# Patient Record
Sex: FEMALE
[De-identification: names, ages, dates, MRNs, and addresses within clinical notes are randomized per-mention and may not be internally consistent; named-entity substitution may affect disease eponyms.]

---

## 2021-09-12 ENCOUNTER — APPOINTMENT (OUTPATIENT)
Dept: INTERNAL MEDICINE | Facility: CLINIC | Age: 86
End: 2021-09-12
Payer: MEDICARE

## 2021-09-12 ENCOUNTER — LABORATORY RESULT (OUTPATIENT)
Age: 86
End: 2021-09-12

## 2021-09-12 VITALS
HEIGHT: 59 IN | DIASTOLIC BLOOD PRESSURE: 76 MMHG | OXYGEN SATURATION: 98 % | TEMPERATURE: 97.9 F | WEIGHT: 163 LBS | SYSTOLIC BLOOD PRESSURE: 126 MMHG | BODY MASS INDEX: 32.86 KG/M2 | RESPIRATION RATE: 18 BRPM | HEART RATE: 85 BPM

## 2021-09-12 DIAGNOSIS — E66.9 OBESITY, UNSPECIFIED: ICD-10-CM

## 2021-09-12 DIAGNOSIS — R35.0 FREQUENCY OF MICTURITION: ICD-10-CM

## 2021-09-12 DIAGNOSIS — Z13.39 ENCOUNTER FOR SCREENING EXAM FOR OTHER MENTAL HEALTH AND BEHAVIORAL DISORDERS: ICD-10-CM

## 2021-09-12 DIAGNOSIS — Z00.00 ENCOUNTER FOR GENERAL ADULT MEDICAL EXAMINATION W/OUT ABNORMAL FINDINGS: ICD-10-CM

## 2021-09-12 DIAGNOSIS — Z13.31 ENCOUNTER FOR SCREENING FOR DEPRESSION: ICD-10-CM

## 2021-09-12 DIAGNOSIS — M51.27 OTHER INTERVERTEBRAL DISC DISPLACEMENT, LUMBOSACRAL REGION: ICD-10-CM

## 2021-09-12 PROCEDURE — G0444 DEPRESSION SCREEN ANNUAL: CPT | Mod: 59

## 2021-09-12 PROCEDURE — 99203 OFFICE O/P NEW LOW 30 MIN: CPT | Mod: 25

## 2021-09-12 PROCEDURE — G0442 ANNUAL ALCOHOL SCREEN 15 MIN: CPT

## 2021-09-12 PROCEDURE — G0439: CPT

## 2021-09-12 PROCEDURE — 36415 COLL VENOUS BLD VENIPUNCTURE: CPT

## 2021-09-12 RX ORDER — ACETAMINOPHEN 650 MG/1
650 TABLET, FILM COATED, EXTENDED RELEASE ORAL 4 TIMES DAILY
Qty: 360 | Refills: 3 | Status: ACTIVE | COMMUNITY
Start: 2021-09-12 | End: 1900-01-01

## 2021-09-12 NOTE — HISTORY OF PRESENT ILLNESS
[PMH Reviewed and Updated] : past medical history reviewed and updated [PSH Reviewed and Updated] : past surgical history reviewed and updated [Family History Reviewed and Updated] : family history reviewed and updated [Medication and Allergies Reconciled] : medication and allergies reconciled [3] : 3 [Retired] : retired from work [Smoking Status Reviewed and Updated] : Smoking status was reviewed and updated [None] : The patient has no concerns about alcohol abuse [Compliant with medications] : compliant with medications [Needs some help with ADLs] : need some help with ADLs [Does not drive] : does not drive [History of falls] : history of falls [FreeTextEntry1] : Right hip pain due to bad disc\par \par urinary frequency at night [Over the Past 2 Weeks, Have You Felt Down, Depressed, or Hopeless?] : 1.) Over the past 2 weeks, have you felt down, depressed, or hopeless? No [Over the Past 2 Weeks, Have You Felt Little Interest or Pleasure Doing Things?] : 2.) Over the past 2 weeks, have you felt little interest or pleasure doing things? No [FreeTextEntry6] : Fell at home with in [place].  As result is now living with daughter in [place] [Full assistance needed] : Assistance needed managing medications [] : Assistance needed managing finances. [Walker] : walker [Wheelchair] : wheelchair

## 2021-09-12 NOTE — PHYSICAL EXAM
[No Acute Distress] : no acute distress [Well Nourished] : well nourished [Well Developed] : well developed [Well-Appearing] : well-appearing [Normal Sclera/Conjunctiva] : normal sclera/conjunctiva [EOMI] : extraocular movements intact [PERRL] : pupils equal round and reactive to light [Normal Outer Ear/Nose] : the outer ears and nose were normal in appearance [Normal Oropharynx] : the oropharynx was normal [No Lymphadenopathy] : no lymphadenopathy [Supple] : supple [Thyroid Normal, No Nodules] : the thyroid was normal and there were no nodules present [No Respiratory Distress] : no respiratory distress  [Clear to Auscultation] : lungs were clear to auscultation bilaterally [No Accessory Muscle Use] : no accessory muscle use [Normal Rate] : normal rate  [Regular Rhythm] : with a regular rhythm [Normal S1, S2] : normal S1 and S2 [No Murmur] : no murmur heard [Soft] : abdomen soft [Non Tender] : non-tender [Non-distended] : non-distended [No Masses] : no abdominal mass palpated [No HSM] : no HSM [Normal Bowel Sounds] : normal bowel sounds [Normal Posterior Cervical Nodes] : no posterior cervical lymphadenopathy [Normal Anterior Cervical Nodes] : no anterior cervical lymphadenopathy [No CVA Tenderness] : no CVA  tenderness [No Spinal Tenderness] : no spinal tenderness [No Joint Swelling] : no joint swelling [Grossly Normal Strength/Tone] : grossly normal strength/tone [Coordination Grossly Intact] : coordination grossly intact [No Rash] : no rash [No Focal Deficits] : no focal deficits [Normal Gait] : normal gait [Deep Tendon Reflexes (DTR)] : deep tendon reflexes were 2+ and symmetric [Normal Affect] : the affect was normal [Normal Insight/Judgement] : insight and judgment were intact [de-identified] : right foot edema

## 2021-09-12 NOTE — HEALTH RISK ASSESSMENT
[Good] : ~his/her~  mood as  good [No] : No [Never (0 pts)] : Never (0 points) [Any fall with injury in past year] : Patient reported fall with injury in the past year [0] : 2) Feeling down, depressed, or hopeless: Not at all (0) [PHQ-2 Negative - No further assessment needed] : PHQ-2 Negative - No further assessment needed [With Family] : lives with family [Retired] : retired [] :  [] : No [[id number]] : 0 [Change in mental status noted] : No change in mental status noted [Reports changes in hearing] : Reports no changes in hearing [Reports changes in vision] : Reports no changes in vision

## 2021-09-15 ENCOUNTER — RX CHANGE (OUTPATIENT)
Age: 86
End: 2021-09-15

## 2021-09-15 LAB
25(OH)D3 SERPL-MCNC: 33.2 NG/ML
ALBUMIN SERPL ELPH-MCNC: 4.1 G/DL
ALP BLD-CCNC: 73 U/L
ALT SERPL-CCNC: 16 U/L
ANION GAP SERPL CALC-SCNC: 9 MMOL/L
AST SERPL-CCNC: 32 U/L
BASOPHILS # BLD AUTO: 0.04 K/UL
BASOPHILS NFR BLD AUTO: 0.6 %
BILIRUB SERPL-MCNC: 0.5 MG/DL
BUN SERPL-MCNC: 16 MG/DL
CALCIUM SERPL-MCNC: 9.7 MG/DL
CHLORIDE SERPL-SCNC: 103 MMOL/L
CHOLEST SERPL-MCNC: 238 MG/DL
CO2 SERPL-SCNC: 28 MMOL/L
CREAT SERPL-MCNC: 0.64 MG/DL
EOSINOPHIL # BLD AUTO: 0.16 K/UL
EOSINOPHIL NFR BLD AUTO: 2.3 %
ESTIMATED AVERAGE GLUCOSE: 134 MG/DL
FOLATE SERPL-MCNC: >20 NG/ML
GLUCOSE SERPL-MCNC: 131 MG/DL
HBA1C MFR BLD HPLC: 6.3 %
HCT VFR BLD CALC: 33.3 %
HDLC SERPL-MCNC: 53 MG/DL
HGB BLD-MCNC: 10.4 G/DL
IMM GRANULOCYTES NFR BLD AUTO: 0.3 %
LDLC SERPL CALC-MCNC: 154 MG/DL
LYMPHOCYTES # BLD AUTO: 2.8 K/UL
LYMPHOCYTES NFR BLD AUTO: 39.7 %
MAN DIFF?: NORMAL
MCHC RBC-ENTMCNC: 26.3 PG
MCHC RBC-ENTMCNC: 31.2 GM/DL
MCV RBC AUTO: 84.3 FL
MONOCYTES # BLD AUTO: 0.54 K/UL
MONOCYTES NFR BLD AUTO: 7.7 %
NEUTROPHILS # BLD AUTO: 3.49 K/UL
NEUTROPHILS NFR BLD AUTO: 49.4 %
NONHDLC SERPL-MCNC: 185 MG/DL
PLATELET # BLD AUTO: 214 K/UL
POTASSIUM SERPL-SCNC: 5.9 MMOL/L
PROT SERPL-MCNC: 7 G/DL
RBC # BLD: 3.95 M/UL
RBC # FLD: 16.9 %
SODIUM SERPL-SCNC: 140 MMOL/L
TRIGL SERPL-MCNC: 153 MG/DL
TSH SERPL-ACNC: 23.5 UIU/ML
VIT B12 SERPL-MCNC: 1280 PG/ML
WBC # FLD AUTO: 7.05 K/UL

## 2021-09-21 ENCOUNTER — APPOINTMENT (OUTPATIENT)
Dept: PHYSICAL MEDICINE AND REHAB | Facility: CLINIC | Age: 86
End: 2021-09-21

## 2021-10-07 ENCOUNTER — RX CHANGE (OUTPATIENT)
Age: 86
End: 2021-10-07

## 2021-10-19 ENCOUNTER — NON-APPOINTMENT (OUTPATIENT)
Age: 86
End: 2021-10-19

## 2021-12-19 DIAGNOSIS — U07.1 COVID-19: ICD-10-CM

## 2021-12-20 ENCOUNTER — APPOINTMENT (OUTPATIENT)
Dept: INTERNAL MEDICINE | Facility: CLINIC | Age: 86
End: 2021-12-20
Payer: MEDICARE

## 2021-12-20 VITALS
HEIGHT: 59 IN | OXYGEN SATURATION: 97 % | DIASTOLIC BLOOD PRESSURE: 86 MMHG | BODY MASS INDEX: 28.43 KG/M2 | SYSTOLIC BLOOD PRESSURE: 136 MMHG | RESPIRATION RATE: 17 BRPM | TEMPERATURE: 98.1 F | HEART RATE: 88 BPM | WEIGHT: 141 LBS

## 2021-12-20 DIAGNOSIS — K56.7 ILEUS, UNSPECIFIED: ICD-10-CM

## 2021-12-20 PROCEDURE — 99215 OFFICE O/P EST HI 40 MIN: CPT

## 2021-12-23 PROBLEM — K56.7 ILEUS: Status: ACTIVE | Noted: 2021-12-21

## 2021-12-23 NOTE — COUNSELING
[Fall prevention counseling provided] : Fall prevention counseling provided [Adequate lighting] : Adequate lighting [No throw rugs] : No throw rugs [Use proper foot wear] : Use proper foot wear [Use recommended devices] : Use recommended devices [Behavioral health counseling provided] : Behavioral health counseling provided [Sleep ___ hours/day] : Sleep [unfilled] hours/day [Engage in a relaxing activity] : Engage in a relaxing activity [Plan in advance] : Plan in advance [Potential consequences of obesity discussed] : Potential consequences of obesity discussed [Benefits of weight loss discussed] : Benefits of weight loss discussed [Structured Weight Management Program suggested:] : Structured weight management program suggested [Encouraged to maintain food diary] : Encouraged to maintain food diary [Encouraged to increase physical activity] : Encouraged to increase physical activity [Encouraged to use exercise tracking device] : Encouraged to use exercise tracking device [Weigh Self Weekly] : weigh self weekly [Decrease Portions] : decrease portions [None] : None [de-identified] : Bp stable, continue with medications, dash diet, exercise, and dietary management.Continue to check home Bp’s.\par \par Symptomatic patients : Test for influenza, if positive, treat for influenza and do not continue below. \par 1. Fever plus cough or shortness of breath : Test for RVP and COVID-19.\par 2.Indirect, circumstantial or unclear exposure to COVID-19, or other concerning cases not meeting above criteria: Please call [facility] to discuss testing. \par +++ All above cases must be reported to the [facility] registry. +++\par \par Asymptomatic patients: \par 1. Known first-degree direct-contact exposure to positive COVID-19 patient but asymptomatic: No testing PLUS 14 day self-quarantine. Pt to call if symptoms develop. Report to [facility] Registry.\par 2. No known exposure and asymptomatic, referred from outside healthcare organization: Please call [facility] to discuss testing. \par 3.All other asymptomatic patients with no known exposures: no testing, no exceptions.\par \par 1. Make an appointment with your doctor\par Begin your fall-prevention plan by making an appointment with your doctor. Be prepared to answer questions such as:\par \par What medications are you taking? Make a list of your prescription and over-the-counter medications and supplements, or bring them with you to the appointment. Your doctor can review your medications for side effects and interactions that may increase your risk of falling. To help with fall prevention, your doctor may consider weaning you off medications that make you tired or affect your thinking, such as sedatives and some types of antidepressants.\par Have you fallen before? Write down the details, including when, where and how you fell. Be prepared to discuss instances when you almost fell but were caught by someone or managed to grab hold of something just in time. Details such as these may help your doctor identify specific fall-prevention strategies.\par Could your health conditions cause a fall? Certain eye and ear disorders may increase your risk of falls. Be prepared to discuss your health conditions and how comfortable you are when you walk - for example, do you feel any dizziness, joint pain, shortness of breath, or numbness in your feet and legs when you walk? Your doctor may evaluate your muscle strength, balance and walking style (gait) as well.\par 2. Keep moving\par Physical activity can go a long way toward fall prevention. With your doctor's OK, consider activities such as walking, water workouts or shayan chi - a gentle exercise that involves slow and graceful dance-like movements. Such activities reduce the risk of falls by improving strength, balance, coordination and flexibility.\par \par If you avoid physical activity because you're afraid it will make a fall more likely, tell your doctor. He or she may recommend carefully monitored exercise programs or refer you to a physical therapist. The physical therapist can create a custom exercise program aimed at improving your balance, flexibility, muscle strength and gait.\par \par 3. Wear sensible shoes\par Consider changing your footwear as part of your fall-prevention plan. High heels, floppy slippers and shoes with slick soles can make you slip, stumble and fall. So can walking in your stocking feet. Instead, wear properly fitting, sturdy shoes with nonskid soles. Sensible shoes may also reduce joint pain.\par \par 4. Remove home hazards\par Take a look around your home. Your living room, kitchen, bedroom, bathroom, hallways and stairways may be filled with hazards. To make your home safer:\par \par Remove boxes, newspapers, electrical cords and phone cords from walkways.\par Move coffee tables, magazine racks and plant stands from high-traffic areas.\par Secure loose rugs with double-faced tape, tacks or a slip-resistant backing - or remove loose rugs from your home.\par Repair loose, wooden floorboards and carpeting right away.\par Store clothing, dishes, food and other necessities within easy reach.\par Immediately clean spilled liquids, grease or food.\par Use nonslip mats in your bathtub or shower. Use a bath seat, which allows you to sit while showering.\par 5. Light up your living space\par Keep your home brightly lit to avoid tripping on objects that are hard to see. Also:\par \par Place night lights in your bedroom, bathroom and hallways.\par Place a lamp within reach of your bed for middle-of-the-night needs.\par Make clear paths to light switches that aren't near room entrances. Consider trading traditional switches for glow-in-the-dark or illuminated switches.\par Turn on the lights before going up or down stairs.\par Store flashlights in easy-to-find places in case of power outages.\par 6. Use assistive devices\par Your doctor might recommend using a cane or walker to keep you steady. Other assistive devices can help, too. For example:\par \par Hand rails for both sides of stairways\par Nonslip treads for bare-wood steps\par A raised toilet seat or one with armrests\par Grab bars for the shower or tub\par A sturdy plastic seat for the shower or tub - plus a hand-held shower nozzle for bathing while sitting down\par If necessary, ask your doctor for a referral to an occupational therapist. He or she can help you brainstorm other fall-prevention strategies. Some solutions are easily installed and relatively inexpensive. Others may require professional help or a larger investment. If you're concerned about the cost, remember that an investment in fall prevention is an investment in your independence.\par \par \par

## 2021-12-23 NOTE — ADDENDUM
[FreeTextEntry1] : Copious amounts of medical records from [facility] reviewed.  Including hospital course imaging blood work consultations/patient is a elderly female wheelchair-bound history of CVA congestive heart failure and ileus patient has a history of compression fractures of the spine, hypertension hypothyroidism

## 2021-12-23 NOTE — HEALTH RISK ASSESSMENT
[Intercurrent ED visits] : went to ED [Intercurrent hospitalizations] : was admitted to the hospital  [No] : No [No falls in past year] : Patient reported no falls in the past year [0] : 2) Feeling down, depressed, or hopeless: Not at all (0) [Change in mental status noted] : Change in mental status noted [Learning/Retaining New Information] : difficulty learning/retaining new information [Handling Complex Tasks] : difficulty handling complex tasks [Reasoning] : difficulty with reasoning [Spatial Ability and Orientation] : difficulty with spatial ability and orientation [With Family] : lives with family [High School] : high school [] :  [# Of Children ___] : has [unfilled] children [Feels Safe at Home] : Feels safe at home [Reports normal functional visual acuity (ie: able to read med bottle)] : Reports normal functional visual acuity [Carbon Monoxide Detector] : carbon monoxide detector [Safety elements used in home] : safety elements used in home [Seat Belt] :  uses seat belt [Sunscreen] : uses sunscreen [Reviewed no changes] : Reviewed, no changes [de-identified] : Low-salt [de-identified] : Wheelchair-bound [Language] : denies difficulty with language [Behavior] : denies difficulty with behavior [Reports changes in hearing] : Reports no changes in hearing [Reports changes in vision] : Reports no changes in vision [Reports changes in dental health] : Reports no changes in dental health [Guns at Home] : no guns at home [Travel to Developing Areas] : does not  travel to developing areas [Caregiver Concerns] : does not have caregiver concerns

## 2021-12-23 NOTE — PHYSICAL EXAM
[No Acute Distress] : no acute distress [Well Nourished] : well nourished [Well Developed] : well developed [Well-Appearing] : well-appearing [Normal Sclera/Conjunctiva] : normal sclera/conjunctiva [PERRL] : pupils equal round and reactive to light [EOMI] : extraocular movements intact [Normal Outer Ear/Nose] : the outer ears and nose were normal in appearance [Normal Oropharynx] : the oropharynx was normal [No JVD] : no jugular venous distention [No Lymphadenopathy] : no lymphadenopathy [Supple] : supple [Thyroid Normal, No Nodules] : the thyroid was normal and there were no nodules present [No Respiratory Distress] : no respiratory distress  [No Accessory Muscle Use] : no accessory muscle use [Clear to Auscultation] : lungs were clear to auscultation bilaterally [Normal Rate] : normal rate  [Regular Rhythm] : with a regular rhythm [Normal S1, S2] : normal S1 and S2 [No Murmur] : no murmur heard [No Carotid Bruits] : no carotid bruits [No Abdominal Bruit] : a ~M bruit was not heard ~T in the abdomen [No Varicosities] : no varicosities [Pedal Pulses Present] : the pedal pulses are present [No Edema] : there was no peripheral edema [No Palpable Aorta] : no palpable aorta [No Extremity Clubbing/Cyanosis] : no extremity clubbing/cyanosis [Soft] : abdomen soft [Non Tender] : non-tender [Non-distended] : non-distended [No Masses] : no abdominal mass palpated [No HSM] : no HSM [Normal Bowel Sounds] : normal bowel sounds [Normal Posterior Cervical Nodes] : no posterior cervical lymphadenopathy [Normal Anterior Cervical Nodes] : no anterior cervical lymphadenopathy [No CVA Tenderness] : no CVA  tenderness [No Spinal Tenderness] : no spinal tenderness [No Joint Swelling] : no joint swelling [Grossly Normal Strength/Tone] : grossly normal strength/tone [No Rash] : no rash [Coordination Grossly Intact] : coordination grossly intact [No Focal Deficits] : no focal deficits [Normal Gait] : normal gait [Deep Tendon Reflexes (DTR)] : deep tendon reflexes were 2+ and symmetric [Normal Affect] : the affect was normal [Normal Insight/Judgement] : insight and judgment were intact [70151 - High Complexity requires an extensive number of possible diagnoses and/or the management options, extensive complexity of the medical data (tests, etc.) to be reviewed, and a high risk of significant complications, morbidity, and/or mortality as w] : High Complexity  [de-identified] : Crackles in her lungs /scattered

## 2021-12-23 NOTE — HISTORY OF PRESENT ILLNESS
[Post-hospitalization from ___ Hospital] : Post-hospitalization from [unfilled] Hospital [Admitted on: ___] : The patient was admitted on [unfilled] [Discharged on ___] : discharged on [unfilled] [Discharge Summary] : discharge summary [Patient Contacted By: ____] : and contacted by [unfilled] [FreeTextEntry2] : 86 year old female presents herself today for a F/U,GHM and a post hospital visit. 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 She then had heart failure and was sent to rehab from Nov 9th to December 15th at the [facility]\par She is fully vaccinated for covid-19 and her booster shot.\par Patient also has swollen ankles and is currently on the following medication:Eliquis 5mg, aspirin 81mg,atorvastatin 80mg,metformin 500mg,metoprolol 25mg, Synthroid 150,Tylenol 500mg.furosemide 40 mg loratadine 10mg. \par change furosemide to 20mg.\par Otherwise patient is stable. \par \par Voices no further complaints.\par ROS as documented below.\par Denies fever,cough,chills,body aches and SOB.

## 2021-12-30 DIAGNOSIS — Z00.00 ENCOUNTER FOR GENERAL ADULT MEDICAL EXAMINATION W/OUT ABNORMAL FINDINGS: ICD-10-CM

## 2022-01-04 ENCOUNTER — LABORATORY RESULT (OUTPATIENT)
Age: 87
End: 2022-01-04

## 2022-01-05 ENCOUNTER — NON-APPOINTMENT (OUTPATIENT)
Age: 87
End: 2022-01-05

## 2022-01-12 ENCOUNTER — LABORATORY RESULT (OUTPATIENT)
Age: 87
End: 2022-01-12

## 2022-01-25 ENCOUNTER — NON-APPOINTMENT (OUTPATIENT)
Age: 87
End: 2022-01-25

## 2022-02-02 ENCOUNTER — RX CHANGE (OUTPATIENT)
Age: 87
End: 2022-02-02

## 2022-02-02 ENCOUNTER — APPOINTMENT (OUTPATIENT)
Dept: INTERNAL MEDICINE | Facility: CLINIC | Age: 87
End: 2022-02-02

## 2022-02-02 ENCOUNTER — APPOINTMENT (OUTPATIENT)
Dept: INTERNAL MEDICINE | Facility: CLINIC | Age: 87
End: 2022-02-02
Payer: MEDICARE

## 2022-02-02 ENCOUNTER — NON-APPOINTMENT (OUTPATIENT)
Age: 87
End: 2022-02-02

## 2022-02-02 DIAGNOSIS — R19.7 DIARRHEA, UNSPECIFIED: ICD-10-CM

## 2022-02-02 PROCEDURE — 99441: CPT

## 2022-02-03 PROBLEM — R19.7 DIARRHEA: Status: ACTIVE | Noted: 2022-01-25

## 2022-02-06 ENCOUNTER — NON-APPOINTMENT (OUTPATIENT)
Age: 87
End: 2022-02-06

## 2022-03-13 ENCOUNTER — RX RENEWAL (OUTPATIENT)
Age: 87
End: 2022-03-13

## 2022-03-22 RX ORDER — LORATADINE 10 MG/1
10 TABLET ORAL
Qty: 90 | Refills: 3 | Status: ACTIVE | COMMUNITY
Start: 2022-03-22 | End: 1900-01-01

## 2022-04-13 ENCOUNTER — RX CHANGE (OUTPATIENT)
Age: 87
End: 2022-04-13

## 2022-04-13 RX ORDER — CALCITONIN SALMON 200 [IU]/1
200 SPRAY, METERED NASAL
Qty: 3.7 | Refills: 3 | Status: DISCONTINUED | COMMUNITY
Start: 2021-12-20 | End: 2022-04-13

## 2022-04-19 ENCOUNTER — RX CHANGE (OUTPATIENT)
Age: 87
End: 2022-04-19

## 2022-04-19 RX ORDER — QUETIAPINE FUMARATE 25 MG/1
25 TABLET ORAL EVERY MORNING
Qty: 30 | Refills: 0 | Status: DISCONTINUED | COMMUNITY
Start: 2022-03-27 | End: 2022-04-19

## 2022-05-01 ENCOUNTER — RX RENEWAL (OUTPATIENT)
Age: 87
End: 2022-05-01

## 2022-05-01 ENCOUNTER — APPOINTMENT (OUTPATIENT)
Dept: INTERNAL MEDICINE | Facility: CLINIC | Age: 87
End: 2022-05-01
Payer: MEDICARE

## 2022-05-01 VITALS
TEMPERATURE: 96 F | SYSTOLIC BLOOD PRESSURE: 132 MMHG | DIASTOLIC BLOOD PRESSURE: 80 MMHG | HEIGHT: 71 IN | RESPIRATION RATE: 18 BRPM | HEART RATE: 120 BPM | OXYGEN SATURATION: 85 %

## 2022-05-01 DIAGNOSIS — S00.93XA CONTUSION OF UNSPECIFIED PART OF HEAD, INITIAL ENCOUNTER: ICD-10-CM

## 2022-05-01 PROCEDURE — 99215 OFFICE O/P EST HI 40 MIN: CPT

## 2022-05-01 NOTE — HISTORY OF PRESENT ILLNESS
[Congestion] : congestion [Cough] : cough [Earache (L)] : pain in left ear [Earache (R)] : pain in right ear [___ Weeks ago] :  [unfilled] weeks ago [Headache] : headache [de-identified] : wakes up in middle of night [FreeTextEntry8] : Daughter also notes, that she slipped while trying to sit on a chair, and likely struck her right cheek.  There was no loss of consciousness no altered mental status and no visible bruising\par \par Otherwise well, no complaints\par  [Family Member] : family member

## 2022-05-01 NOTE — PHYSICAL EXAM
[No Lymphadenopathy] : no lymphadenopathy [Normal] : no rash [de-identified] : bilateral ears increased clear fluid / bubbles, [+] maxillofacial tenderness [de-identified] : camille Rangel responsive

## 2022-05-10 ENCOUNTER — RX CHANGE (OUTPATIENT)
Age: 87
End: 2022-05-10

## 2022-05-10 RX ORDER — CALCITONIN SALMON 200 [IU]/1
200 SPRAY, METERED NASAL
Qty: 3.7 | Refills: 3 | Status: DISCONTINUED | COMMUNITY
Start: 2022-04-13 | End: 2022-05-10

## 2022-05-13 ENCOUNTER — RX CHANGE (OUTPATIENT)
Age: 87
End: 2022-05-13

## 2022-05-13 RX ORDER — LOSARTAN POTASSIUM 50 MG/1
50 TABLET, FILM COATED ORAL DAILY
Qty: 30 | Refills: 5 | Status: DISCONTINUED | COMMUNITY
Start: 2021-09-12 | End: 2022-05-13

## 2022-05-13 RX ORDER — QUETIAPINE FUMARATE 25 MG/1
25 TABLET ORAL
Qty: 30 | Refills: 0 | Status: DISCONTINUED | COMMUNITY
Start: 2022-04-19 | End: 2022-05-13

## 2022-05-26 ENCOUNTER — LABORATORY RESULT (OUTPATIENT)
Age: 87
End: 2022-05-26

## 2022-06-01 ENCOUNTER — RX RENEWAL (OUTPATIENT)
Age: 87
End: 2022-06-01

## 2022-06-02 ENCOUNTER — RX RENEWAL (OUTPATIENT)
Age: 87
End: 2022-06-02

## 2022-06-22 ENCOUNTER — APPOINTMENT (OUTPATIENT)
Dept: PHYSICAL MEDICINE AND REHAB | Facility: CLINIC | Age: 87
End: 2022-06-22

## 2022-07-19 ENCOUNTER — APPOINTMENT (OUTPATIENT)
Dept: INTERNAL MEDICINE | Facility: CLINIC | Age: 87
End: 2022-07-19

## 2022-07-19 DIAGNOSIS — M48.50XA COLLAPSED VERTEBRA, NOT ELSEWHERE CLASSIFIED, SITE UNSPECIFIED, INITIAL ENCOUNTER FOR FRACTURE: ICD-10-CM

## 2022-07-19 DIAGNOSIS — H91.90 UNSPECIFIED HEARING LOSS, UNSPECIFIED EAR: ICD-10-CM

## 2022-07-19 DIAGNOSIS — I50.9 HEART FAILURE, UNSPECIFIED: ICD-10-CM

## 2022-07-26 ENCOUNTER — APPOINTMENT (OUTPATIENT)
Dept: INTERNAL MEDICINE | Facility: CLINIC | Age: 87
End: 2022-07-26

## 2022-07-26 VITALS
HEIGHT: 71 IN | TEMPERATURE: 97.2 F | BODY MASS INDEX: 19.74 KG/M2 | WEIGHT: 141 LBS | RESPIRATION RATE: 17 BRPM | OXYGEN SATURATION: 89 % | HEART RATE: 89 BPM | DIASTOLIC BLOOD PRESSURE: 84 MMHG | SYSTOLIC BLOOD PRESSURE: 126 MMHG

## 2022-07-26 DIAGNOSIS — R05.8 OTHER SPECIFIED COUGH: ICD-10-CM

## 2022-07-26 DIAGNOSIS — I50.9 HEART FAILURE, UNSPECIFIED: ICD-10-CM

## 2022-07-26 DIAGNOSIS — Z92.89 PERSONAL HISTORY OF OTHER MEDICAL TREATMENT: ICD-10-CM

## 2022-07-26 DIAGNOSIS — I63.9 CEREBRAL INFARCTION, UNSPECIFIED: ICD-10-CM

## 2022-07-26 DIAGNOSIS — K21.9 GASTRO-ESOPHAGEAL REFLUX DISEASE W/OUT ESOPHAGITIS: ICD-10-CM

## 2022-07-26 DIAGNOSIS — E78.00 PURE HYPERCHOLESTEROLEMIA, UNSPECIFIED: ICD-10-CM

## 2022-07-26 DIAGNOSIS — E03.9 HYPOTHYROIDISM, UNSPECIFIED: ICD-10-CM

## 2022-07-26 DIAGNOSIS — F03.90 UNSPECIFIED DEMENTIA W/OUT BEHAVIORAL DISTURBANCE: ICD-10-CM

## 2022-07-26 PROBLEM — H91.90 HEARING DECREASED: Status: ACTIVE | Noted: 2022-07-26

## 2022-07-26 PROBLEM — M48.50XA COMPRESSION FRACTURE OF SPINE: Status: ACTIVE | Noted: 2021-12-20

## 2022-07-26 PROCEDURE — 99214 OFFICE O/P EST MOD 30 MIN: CPT

## 2022-07-26 PROCEDURE — 99496 TRANSJ CARE MGMT HIGH F2F 7D: CPT

## 2022-07-26 RX ORDER — LEVOTHYROXINE SODIUM 150 UG/1
150 CAPSULE ORAL
Qty: 90 | Refills: 0 | Status: DISCONTINUED | COMMUNITY
Start: 2021-12-31

## 2022-07-26 RX ORDER — PANTOPRAZOLE 40 MG/1
40 TABLET, DELAYED RELEASE ORAL
Qty: 30 | Refills: 1 | Status: ACTIVE | COMMUNITY
Start: 2022-07-20

## 2022-07-26 RX ORDER — DOXYCYCLINE 100 MG/1
100 TABLET, FILM COATED ORAL
Qty: 20 | Refills: 0 | Status: DISCONTINUED | COMMUNITY
Start: 2022-05-01 | End: 2022-07-26

## 2022-07-26 RX ORDER — QUETIAPINE FUMARATE 25 MG/1
25 TABLET ORAL
Qty: 90 | Refills: 3 | Status: DISCONTINUED | COMMUNITY
Start: 2022-05-13 | End: 2022-07-26

## 2022-07-26 RX ORDER — LOSARTAN POTASSIUM 50 MG/1
50 TABLET, FILM COATED ORAL
Qty: 90 | Refills: 3 | Status: DISCONTINUED | COMMUNITY
Start: 2022-05-13 | End: 2022-07-26

## 2022-07-26 RX ORDER — ASPIRIN ENTERIC COATED TABLETS 81 MG 81 MG/1
81 TABLET, DELAYED RELEASE ORAL
Qty: 30 | Refills: 3 | Status: DISCONTINUED | COMMUNITY
Start: 2021-12-30 | End: 2022-07-26

## 2022-07-26 RX ORDER — FLUTICASONE PROPIONATE 50 UG/1
50 SPRAY, METERED NASAL
Qty: 3 | Refills: 3 | Status: ACTIVE | COMMUNITY
Start: 2022-04-19 | End: 1900-01-01

## 2022-07-26 RX ORDER — ASPIRIN 81 MG/1
81 TABLET, COATED ORAL
Qty: 30 | Refills: 3 | Status: DISCONTINUED | COMMUNITY
Start: 2022-05-01 | End: 2022-07-26

## 2022-07-26 RX ORDER — MIRABEGRON 25 MG/1
25 TABLET, FILM COATED, EXTENDED RELEASE ORAL DAILY
Qty: 30 | Refills: 2 | Status: DISCONTINUED | COMMUNITY
Start: 2021-09-12 | End: 2022-07-26

## 2022-07-26 RX ORDER — BENZONATATE 200 MG/1
200 CAPSULE ORAL
Qty: 1 | Refills: 5 | Status: DISCONTINUED | COMMUNITY
Start: 2022-03-27 | End: 2022-07-26

## 2022-07-26 RX ORDER — CALCITONIN SALMON 200 [IU]/1
200 SPRAY, METERED NASAL
Qty: 11.1 | Refills: 2 | Status: DISCONTINUED | COMMUNITY
Start: 2022-05-10 | End: 2022-07-26

## 2022-07-26 RX ORDER — OXYCODONE 5 MG/1
5 TABLET ORAL
Qty: 200 | Refills: 0 | Status: DISCONTINUED | COMMUNITY
Start: 2021-09-12 | End: 2022-07-26

## 2022-07-26 RX ORDER — VANCOMYCIN HYDROCHLORIDE 250 MG/1
250 CAPSULE ORAL EVERY 6 HOURS
Qty: 84 | Refills: 0 | Status: DISCONTINUED | COMMUNITY
Start: 2022-01-25 | End: 2022-07-26

## 2022-07-26 NOTE — HEALTH RISK ASSESSMENT
[Intercurrent ED visits] : went to ED [Intercurrent hospitalizations] : was admitted to the hospital  [No] : No [No falls in past year] : Patient reported no falls in the past year [0] : 2) Feeling down, depressed, or hopeless: Not at all (0) [Change in mental status noted] : Change in mental status noted [Learning/Retaining New Information] : difficulty learning/retaining new information [Handling Complex Tasks] : difficulty handling complex tasks [Reasoning] : difficulty with reasoning [Spatial Ability and Orientation] : difficulty with spatial ability and orientation [With Family] : lives with family [High School] : high school [] :  [# Of Children ___] : has [unfilled] children [Feels Safe at Home] : Feels safe at home [Reports normal functional visual acuity (ie: able to read med bottle)] : Reports normal functional visual acuity [Carbon Monoxide Detector] : carbon monoxide detector [Safety elements used in home] : safety elements used in home [Seat Belt] :  uses seat belt [Sunscreen] : uses sunscreen [Reviewed no changes] : Reviewed, no changes [de-identified] : Wheelchair-bound [de-identified] : Low-salt [Language] : denies difficulty with language [Behavior] : denies difficulty with behavior [Reports changes in hearing] : Reports no changes in hearing [Reports changes in dental health] : Reports no changes in dental health [Reports changes in vision] : Reports no changes in vision [Guns at Home] : no guns at home [Travel to Developing Areas] : does not  travel to developing areas [Caregiver Concerns] : does not have caregiver concerns

## 2022-07-26 NOTE — ASSESSMENT
[FreeTextEntry1] : 10 minutes counseling for Obesity, Pre-DM, Cholesterol - see assessments for details of counseling\par \par total time 50 min, 30 min for FTF, previous record / note review,  10 minutes for documentation\par \par

## 2022-08-23 DIAGNOSIS — K05.10 CHRONIC GINGIVITIS, PLAQUE INDUCED: ICD-10-CM

## 2022-08-23 RX ORDER — CHLORHEXIDINE GLUCONATE, 0.12% ORAL RINSE 1.2 MG/ML
0.12 SOLUTION DENTAL
Qty: 1 | Refills: 0 | Status: ACTIVE | COMMUNITY
Start: 2022-08-23 | End: 1900-01-01

## 2022-09-02 ENCOUNTER — RX RENEWAL (OUTPATIENT)
Age: 87
End: 2022-09-02

## 2022-09-18 ENCOUNTER — RX RENEWAL (OUTPATIENT)
Age: 87
End: 2022-09-18

## 2022-10-02 ENCOUNTER — RX RENEWAL (OUTPATIENT)
Age: 87
End: 2022-10-02

## 2022-10-02 RX ORDER — FUROSEMIDE 20 MG/1
20 TABLET ORAL
Qty: 90 | Refills: 1 | Status: ACTIVE | COMMUNITY
Start: 2021-12-20 | End: 1900-01-01

## 2022-10-06 ENCOUNTER — RX CHANGE (OUTPATIENT)
Age: 87
End: 2022-10-06

## 2022-10-06 RX ORDER — MONTELUKAST 10 MG/1
10 TABLET, FILM COATED ORAL
Qty: 30 | Refills: 1 | Status: DISCONTINUED | COMMUNITY
Start: 2022-09-13 | End: 2022-10-06

## 2022-10-12 RX ORDER — APIXABAN 5 MG/1
5 TABLET, FILM COATED ORAL
Qty: 60 | Refills: 0 | Status: ACTIVE | COMMUNITY
Start: 2021-12-30 | End: 1900-01-01

## 2022-11-08 ENCOUNTER — APPOINTMENT (OUTPATIENT)
Dept: INTERNAL MEDICINE | Facility: CLINIC | Age: 87
End: 2022-11-08

## 2022-11-08 VITALS
HEIGHT: 71 IN | HEART RATE: 88 BPM | RESPIRATION RATE: 17 BRPM | BODY MASS INDEX: 19.74 KG/M2 | OXYGEN SATURATION: 97 % | TEMPERATURE: 97 F | DIASTOLIC BLOOD PRESSURE: 72 MMHG | SYSTOLIC BLOOD PRESSURE: 102 MMHG | WEIGHT: 141 LBS

## 2022-11-08 DIAGNOSIS — R05.9 COUGH, UNSPECIFIED: ICD-10-CM

## 2022-11-08 DIAGNOSIS — D64.9 ANEMIA, UNSPECIFIED: ICD-10-CM

## 2022-11-08 DIAGNOSIS — J30.2 OTHER SEASONAL ALLERGIC RHINITIS: ICD-10-CM

## 2022-11-08 DIAGNOSIS — E11.9 TYPE 2 DIABETES MELLITUS W/OUT COMPLICATIONS: ICD-10-CM

## 2022-11-08 DIAGNOSIS — Z87.09 PERSONAL HISTORY OF OTHER DISEASES OF THE RESPIRATORY SYSTEM: ICD-10-CM

## 2022-11-08 DIAGNOSIS — I69.351 HEMIPLEGIA AND HEMIPARESIS FOLLOWING CEREBRAL INFARCTION AFFECTING RIGHT DOMINANT SIDE: ICD-10-CM

## 2022-11-08 DIAGNOSIS — K92.2 GASTROINTESTINAL HEMORRHAGE, UNSPECIFIED: ICD-10-CM

## 2022-11-08 DIAGNOSIS — I10 ESSENTIAL (PRIMARY) HYPERTENSION: ICD-10-CM

## 2022-11-08 DIAGNOSIS — G47.00 INSOMNIA, UNSPECIFIED: ICD-10-CM

## 2022-11-08 PROCEDURE — 99214 OFFICE O/P EST MOD 30 MIN: CPT

## 2022-11-08 RX ORDER — MONTELUKAST 10 MG/1
10 TABLET, FILM COATED ORAL
Qty: 90 | Refills: 1 | Status: DISCONTINUED | COMMUNITY
Start: 2022-10-06 | End: 2022-11-08

## 2022-11-08 RX ORDER — APIXABAN 2.5 MG/1
2.5 TABLET, FILM COATED ORAL
Qty: 90 | Refills: 0 | Status: ACTIVE | COMMUNITY
Start: 2022-11-03 | End: 1900-01-01

## 2022-11-08 NOTE — HISTORY OF PRESENT ILLNESS
[Family Member] : family member [FreeTextEntry1] : Multiple Concerns [de-identified] : 1. Needs hold for Eliquis for spinal steroid injecitons\par 2. Can't sleep\par 3. Chronic cough, singulair not helping

## 2022-11-11 RX ORDER — FEXOFENADINE HYDROCHLORIDE 180 MG/1
180 TABLET ORAL DAILY
Qty: 90 | Refills: 3 | Status: ACTIVE | COMMUNITY
Start: 2022-11-08 | End: 1900-01-01

## 2022-11-28 ENCOUNTER — RX RENEWAL (OUTPATIENT)
Age: 87
End: 2022-11-28

## 2022-11-28 RX ORDER — METOPROLOL TARTRATE 25 MG/1
25 TABLET, FILM COATED ORAL TWICE DAILY
Qty: 90 | Refills: 0 | Status: ACTIVE | COMMUNITY
Start: 2021-12-29 | End: 1900-01-01

## 2022-12-02 ENCOUNTER — RX CHANGE (OUTPATIENT)
Age: 87
End: 2022-12-02

## 2022-12-02 RX ORDER — TRAZODONE HYDROCHLORIDE 50 MG/1
50 TABLET ORAL
Qty: 30 | Refills: 5 | Status: DISCONTINUED | COMMUNITY
Start: 2022-11-08 | End: 2022-12-02

## 2022-12-02 RX ORDER — TRAZODONE HYDROCHLORIDE 50 MG/1
50 TABLET ORAL
Qty: 90 | Refills: 3 | Status: ACTIVE | COMMUNITY
Start: 2022-12-02 | End: 1900-01-01

## 2023-02-19 ENCOUNTER — RX RENEWAL (OUTPATIENT)
Age: 88
End: 2023-02-19

## 2023-02-19 RX ORDER — LEVOTHYROXINE SODIUM 137 UG/1
137 CAPSULE ORAL DAILY
Qty: 90 | Refills: 1 | Status: ACTIVE | COMMUNITY
Start: 2021-09-12 | End: 1900-01-01

## 2023-03-13 ENCOUNTER — RX RENEWAL (OUTPATIENT)
Age: 88
End: 2023-03-13

## 2023-03-13 RX ORDER — ATORVASTATIN CALCIUM 80 MG/1
80 TABLET, FILM COATED ORAL
Qty: 90 | Refills: 1 | Status: ACTIVE | COMMUNITY
Start: 2021-12-29 | End: 1900-01-01